# Patient Record
Sex: FEMALE | Race: WHITE | NOT HISPANIC OR LATINO | Employment: FULL TIME | URBAN - NONMETROPOLITAN AREA
[De-identification: names, ages, dates, MRNs, and addresses within clinical notes are randomized per-mention and may not be internally consistent; named-entity substitution may affect disease eponyms.]

---

## 2024-09-16 ENCOUNTER — OFFICE VISIT (OUTPATIENT)
Dept: INTERNAL MEDICINE | Facility: CLINIC | Age: 29
End: 2024-09-16
Payer: COMMERCIAL

## 2024-09-16 VITALS
OXYGEN SATURATION: 97 % | TEMPERATURE: 97.8 F | WEIGHT: 263 LBS | DIASTOLIC BLOOD PRESSURE: 87 MMHG | BODY MASS INDEX: 41.28 KG/M2 | RESPIRATION RATE: 16 BRPM | SYSTOLIC BLOOD PRESSURE: 136 MMHG | HEART RATE: 70 BPM | HEIGHT: 67 IN

## 2024-09-16 DIAGNOSIS — Z13.29 SCREENING FOR ENDOCRINE, NUTRITIONAL, METABOLIC AND IMMUNITY DISORDER: ICD-10-CM

## 2024-09-16 DIAGNOSIS — Z23 NEED FOR VACCINATION: ICD-10-CM

## 2024-09-16 DIAGNOSIS — E55.9 VITAMIN D INSUFFICIENCY: ICD-10-CM

## 2024-09-16 DIAGNOSIS — Z00.00 PHYSICAL EXAM, ANNUAL: Primary | ICD-10-CM

## 2024-09-16 DIAGNOSIS — Z13.0 SCREENING FOR ENDOCRINE, NUTRITIONAL, METABOLIC AND IMMUNITY DISORDER: ICD-10-CM

## 2024-09-16 DIAGNOSIS — Z13.21 SCREENING FOR ENDOCRINE, NUTRITIONAL, METABOLIC AND IMMUNITY DISORDER: ICD-10-CM

## 2024-09-16 DIAGNOSIS — Z13.228 SCREENING FOR ENDOCRINE, NUTRITIONAL, METABOLIC AND IMMUNITY DISORDER: ICD-10-CM

## 2024-09-16 PROCEDURE — 90471 IMMUNIZATION ADMIN: CPT | Performed by: NURSE PRACTITIONER

## 2024-09-16 PROCEDURE — 99385 PREV VISIT NEW AGE 18-39: CPT | Performed by: NURSE PRACTITIONER

## 2024-09-16 PROCEDURE — 90715 TDAP VACCINE 7 YRS/> IM: CPT | Performed by: NURSE PRACTITIONER

## 2024-09-16 NOTE — PROGRESS NOTES
Chief Complaint   Patient presents with    Annual Exam     Est care        Haley Rodriguez is a 29 y.o. female and is here for a comprehensive physical exam.   History of Present Illness  The patient is a 29-year-old female who presents to [facility] and for an annual physical.    She has been diagnosed with hypothyroidism but has not been on medication for about a year. She reports no exposure to hepatitis C or history of sexually transmitted diseases.    She has never been pregnant and had her first menstrual period at the age of 8. Her menstrual cycles are regular, with heavy bleeding for the first two days. She has no history of Polycystic Ovary Syndrome (PCOS) or vitamin D deficiency. Her last Pap smear was two years ago and was normal.    She has been on Vyvanse since she was 12, which has been effective. She was previously on Adderall, but it was not effective. She reports no issues with bowel movements or constipation while on Vyvanse. She lost weight while on Vyvanse, but her weight has remained stable since starting her job at [employer]. She finds it difficult to eat during the day at work but tries to ensure she eats something.    She occasionally snores and grinds her teeth at night, for which she used a mouthguard during high school. Her anxiety is well-managed, and she sleeps well once she falls asleep.    She does not take any over-the-counter herbs or supplements. Her dental and vision check-ups are not up-to-date, and she does not see a dermatologist. She exercises daily due to her job as a . Her gallbladder is intact.    SOCIAL HISTORY  She lives in [place]. She is engaged. She started being sexually active at the age of 12. She works as a  for 5 years and driving for a little over 3 years. She smokes occasionally. She drinks socially on the weekends.    FAMILY HISTORY  She denies any family history of thyroid cancer or cervical cancer. Her father had prostate cancer,  grandfather had lung cancer, and grandmother had dementia.        History:  LMP: Patient's last menstrual period was 2024 (exact date).  Last pap date: 2 years  Abnormal pap? no  : 0  Para: 0  STD:none  Age of menarche:8  Sexually active:13 male and female  Abuse:none    Do you take any herbs or supplements that were not prescribed by a doctor? no  Are you taking calcium supplements? no  Are you taking aspirin daily? no      Health Habits:  Dental Exam. not up to date - advised patient to schedule  Eye Exam. not up to date - advised patient to schedule  Dermatology.not up to date - advised patient schedule or closely monitor for changes in skin lesions  Exercise: 5 times/week.  Current exercise activities include: walking    Health Maintenance   Topic Date Due    BMI FOLLOWUP  Never done    Pneumococcal Vaccine 0-64 (1 of 2 - PCV) Never done    ANNUAL PHYSICAL  Never done    PAP SMEAR  Never done    INFLUENZA VACCINE  2024    COVID-19 Vaccine (2 - - season) 2024 (Originally 2024)    HEPATITIS C SCREENING  2025 (Originally 2024)    TDAP/TD VACCINES (2 - Td or Tdap) 2034       PMH, PSH, SocHx, FamHx, Allergies, and Medications: Reviewed and updated in the Visit Navigator.     No Known Allergies  Past Medical History:   Diagnosis Date    ADHD (attention deficit hyperactivity disorder)     Hypothyroidism      Past Surgical History:   Procedure Laterality Date    COLONOSCOPY       Social History     Socioeconomic History    Marital status: Single   Tobacco Use    Smoking status: Some Days     Current packs/day: 0.50     Average packs/day: 0.5 packs/day for 15.0 years (7.5 ttl pk-yrs)     Types: Cigarettes    Smokeless tobacco: Never   Vaping Use    Vaping status: Never Used   Substance and Sexual Activity    Alcohol use: Yes     Comment: Socially    Drug use: Never    Sexual activity: Yes     Partners: Female     Birth control/protection: Partner of same sex  "    Family History   Problem Relation Age of Onset    Cancer Father         Prostate    Cancer Maternal Grandfather         Lung       Review of Systems  Review of Systems      Vitals:    09/16/24 0839   BP: 136/87   Pulse: 70   Resp: 16   Temp: 97.8 °F (36.6 °C)   SpO2: 97%       Objective   /87   Pulse 70   Temp 97.8 °F (36.6 °C) (Temporal)   Resp 16   Ht 170.2 cm (67\")   Wt 119 kg (263 lb)   LMP 09/14/2024 (Exact Date)   SpO2 97%   BMI 41.19 kg/m²   Class 3 Severe Obesity (BMI >=40). Obesity-related health conditions include the following: hypertension, dyslipidemias, and GERD. Obesity is improving with lifestyle modifications. BMI is is above average; BMI management plan is completed. We discussed low calorie, low carb based diet program, portion control, increasing exercise, joining a fitness center or start home based exercise program, Weight Watchers or other Commercial based weight reduction program, and management of depression/anxiety/stress to control compensatory eating.      Physical Exam  Vitals and nursing note reviewed.   Constitutional:       General: She is not in acute distress.     Appearance: She is well-developed. She is obese.   HENT:      Head: Normocephalic and atraumatic.      Right Ear: Ear canal and external ear normal. Tympanic membrane is bulging. Tympanic membrane is not erythematous.      Left Ear: Ear canal and external ear normal. Tympanic membrane is bulging. Tympanic membrane is not erythematous.      Nose: Mucosal edema present. No rhinorrhea.      Right Sinus: No maxillary sinus tenderness or frontal sinus tenderness.      Left Sinus: No maxillary sinus tenderness or frontal sinus tenderness.      Mouth/Throat:      Mouth: Mucous membranes are dry.      Pharynx: Posterior oropharyngeal erythema present.      Comments: PND    Eyes:      Conjunctiva/sclera: Conjunctivae normal.      Pupils: Pupils are equal, round, and reactive to light.   Cardiovascular:      Rate " and Rhythm: Normal rate and regular rhythm.      Heart sounds: Normal heart sounds.   Pulmonary:      Effort: Pulmonary effort is normal. No respiratory distress.      Breath sounds: Normal breath sounds.   Abdominal:      General: Bowel sounds are normal.      Palpations: Abdomen is soft.   Musculoskeletal:         General: Normal range of motion.      Cervical back: Normal range of motion.   Lymphadenopathy:      Head:      Right side of head: No submental, submandibular or tonsillar adenopathy.      Left side of head: No submental, submandibular or tonsillar adenopathy.      Cervical: No cervical adenopathy.   Skin:     General: Skin is warm and dry.      Capillary Refill: Capillary refill takes less than 2 seconds.      Findings: No rash.   Neurological:      Mental Status: She is alert and oriented to person, place, and time.   Psychiatric:         Behavior: Behavior normal.         Thought Content: Thought content normal.         Judgment: Judgment normal.          9/16/2024   Anxiety JESSIE-7    Feeling nervous, anxious or on edge 0    Not being able to stop or control worrying 0    Worrying too much about different things 0    Trouble Relaxing 0    Being so restless that it is hard to sit still 0    Becoming easily annoyed or irritable 0    Feeling afraid as if something awful might happen 0    JESSIE 7 Total Score 0      PHQ-9 Depression Screening  Little interest or pleasure in doing things? 0-->not at all   Feeling down, depressed, or hopeless? 0-->not at all   Trouble falling or staying asleep, or sleeping too much? 1-->several days   Feeling tired or having little energy? 2-->more than half the days   Poor appetite or overeating? 2-->more than half the days   Feeling bad about yourself - or that you are a failure or have let yourself or your family down? 0-->not at all   Trouble concentrating on things, such as reading the newspaper or watching television? 3-->nearly every day   Moving or speaking so slowly  that other people could have noticed? Or the opposite - being so fidgety or restless that you have been moving around a lot more than usual? 0-->not at all   Thoughts that you would be better off dead, or of hurting yourself in some way? 0-->not at all   PHQ-9 Total Score 8   If you checked off any problems, how difficult have these problems made it for you to do your work, take care of things at home, or get along with other people?            Assessment & Plan   1. Healthy female exam.    2. Patient Counseling: Including but not Limited to the following, when appropriate:  --Nutrition: Stressed importance of moderation in sodium/caffeine intake, saturated fat and cholesterol, caloric balance, sufficient intake of fresh fruits, vegetables, fiber, calcium, iron, and 1 mg of folate supplement per day (for females capable of pregnancy).  --Discussed the issue of estrogen replacement, calcium supplement, and the daily use of baby aspirin.  --Exercise: Stressed the importance of regular exercise.   --Substance Abuse: Discussed cessation/primary prevention of tobacco, alcohol, or other drug use; driving or other dangerous activities under the influence; availability of treatment for abuse, as indicated based on social history.    --Sexuality: Discussed sexually transmitted diseases, partner selection, use of condoms, avoidance of unintended pregnancy  and contraceptive alternatives.   --Injury prevention: Discussed safety belts, safety helmets, smoke detector, smoking near bedding or upholstery.   --Dental health: Discussed importance of regular tooth brushing, flossing, and dental visits.  --Immunizations reviewed.  --Discussed benefits of colon cancer screening.      3. Discussed the patient's BMI with her.  The BMI is above average; BMI management plan is completed  4. Return if symptoms worsen or fail to improve.  5. Age-appropriate Screening Scheduled      Assessment & Plan   Assessment & Plan  1. Annual  Physical.  Elevated blood pressure was noted during today's visit. A comprehensive thyroid panel, including TSH, T4, and thyroid antibodies, will be conducted. Routine labs, including A1c, cholesterol, kidney function, liver function, and iron level, will be checked. Vitamin D levels will also be assessed. She was advised to monitor her blood pressure at home and to wear an Apple watch to track her sleep quality. Should she experience symptoms such as waking up tired or with headaches, a sleep study may be considered.    2. Hypothyroidism.  A comprehensive thyroid panel, including TSH, T4, and thyroid antibodies, will be conducted to evaluate her thyroid function. She has not been on thyroid medication for about a year. No family history of thyroid cancer or autoimmune thyroid disease was reported.    3. Attention Deficit Disorder (ADD).  She has been on Vyvanse since she was 12 years old, which has been effective. However, due to issues with her previous provider, she has had difficulty obtaining the medication. She was advised to find a psych nurse practitioner or psychologist for ongoing management and prescription of Vyvanse. She was also informed about a newer non-controlled medication, Kilbre, which does not require frequent monitoring of blood pressure.    4. Health Maintenance.  A tetanus vaccine will be administered today, which is good for 10 years. She was advised to get up to date on dental and vision exams. She was also reminded to perform regular breast self-exams and to notify the provider of any changes. The next Pap smear is due in one year, and it can be done during the next physical.      Diagnoses and all orders for this visit:    1. Physical exam, annual (Primary)  -     Comprehensive Metabolic Panel  -     Lipid Panel  -     CBC Auto Differential    2. Need for vaccination  -     Tdap Vaccine => 8yo IM (BOOSTRIX/ADACEL)    3. Screening for endocrine, nutritional, metabolic and immunity  disorder  -     Hemoglobin A1c  -     Vitamin B12  -     Thyroid Panel With TSH  -     Thyroid Antibodies  -     Iron and TIBC  -     Ferritin  -     Insulin, Free & Total, Serum    4. Vitamin D insufficiency  -     Vitamin D,25-Hydroxy           Patient or patient representative verbalized consent for the use of Ambient Listening during the visit with  CRISTOBAL Roberts for chart documentation.         CRISTOBAL Roberts 09/16/2024

## 2024-09-27 ENCOUNTER — PATIENT ROUNDING (BHMG ONLY) (OUTPATIENT)
Dept: INTERNAL MEDICINE | Facility: CLINIC | Age: 29
End: 2024-09-27
Payer: COMMERCIAL

## 2024-09-27 LAB
25(OH)D3+25(OH)D2 SERPL-MCNC: 27.3 NG/ML (ref 30–100)
ALBUMIN SERPL-MCNC: 4.4 G/DL (ref 3.5–5.2)
ALBUMIN/GLOB SERPL: 1.9 G/DL
ALP SERPL-CCNC: 82 U/L (ref 39–117)
ALT SERPL-CCNC: 19 U/L (ref 1–33)
AST SERPL-CCNC: 18 U/L (ref 1–32)
BILIRUB SERPL-MCNC: 0.3 MG/DL (ref 0–1.2)
BUN SERPL-MCNC: 14 MG/DL (ref 6–20)
BUN/CREAT SERPL: 20 (ref 7–25)
CALCIUM SERPL-MCNC: 9.2 MG/DL (ref 8.6–10.5)
CHLORIDE SERPL-SCNC: 104 MMOL/L (ref 98–107)
CHOLEST SERPL-MCNC: 244 MG/DL (ref 0–200)
CO2 SERPL-SCNC: 23.7 MMOL/L (ref 22–29)
CREAT SERPL-MCNC: 0.7 MG/DL (ref 0.57–1)
EGFRCR SERPLBLD CKD-EPI 2021: 118.7 ML/MIN/1.73
FERRITIN SERPL-MCNC: 45.6 NG/ML (ref 13–150)
FT4I SERPL CALC-MCNC: 1.4 (ref 1.2–4.9)
GLOBULIN SER CALC-MCNC: 2.3 GM/DL
GLUCOSE SERPL-MCNC: 92 MG/DL (ref 65–99)
HBA1C MFR BLD: 5.1 % (ref 4.8–5.6)
HDLC SERPL-MCNC: 53 MG/DL (ref 40–60)
INSULIN FREE SERPL-ACNC: 26 UU/ML
INSULIN SERPL-ACNC: 26 UU/ML
IRON SATN MFR SERPL: 14 % (ref 20–50)
IRON SERPL-MCNC: 64 MCG/DL (ref 37–145)
LDLC SERPL CALC-MCNC: 161 MG/DL (ref 0–100)
POTASSIUM SERPL-SCNC: 4.3 MMOL/L (ref 3.5–5.2)
PROT SERPL-MCNC: 6.7 G/DL (ref 6–8.5)
SODIUM SERPL-SCNC: 138 MMOL/L (ref 136–145)
T3RU NFR SERPL: 22 % (ref 24–39)
T4 SERPL-MCNC: 6.5 UG/DL (ref 4.5–12)
THYROGLOB AB SERPL-ACNC: 7.6 IU/ML (ref 0–0.9)
THYROPEROXIDASE AB SERPL-ACNC: 26 IU/ML (ref 0–34)
TIBC SERPL-MCNC: 454 MCG/DL
TRIGL SERPL-MCNC: 166 MG/DL (ref 0–150)
TSH SERPL DL<=0.005 MIU/L-ACNC: 3.88 UIU/ML (ref 0.45–4.5)
UIBC SERPL-MCNC: 390 MCG/DL (ref 112–346)
VIT B12 SERPL-MCNC: 596 PG/ML (ref 211–946)
VLDLC SERPL CALC-MCNC: 30 MG/DL (ref 5–40)